# Patient Record
Sex: MALE | Race: WHITE | URBAN - NONMETROPOLITAN AREA
[De-identification: names, ages, dates, MRNs, and addresses within clinical notes are randomized per-mention and may not be internally consistent; named-entity substitution may affect disease eponyms.]

---

## 2018-12-03 ENCOUNTER — OFFICE VISIT (OUTPATIENT)
Dept: OTOLARYNGOLOGY | Facility: OTHER | Age: 23
End: 2018-12-03
Attending: OTOLARYNGOLOGY
Payer: COMMERCIAL

## 2018-12-03 VITALS
WEIGHT: 170 LBS | BODY MASS INDEX: 24.34 KG/M2 | HEART RATE: 73 BPM | TEMPERATURE: 97.6 F | HEIGHT: 70 IN | DIASTOLIC BLOOD PRESSURE: 60 MMHG | SYSTOLIC BLOOD PRESSURE: 120 MMHG

## 2018-12-03 DIAGNOSIS — J01.00 ACUTE MAXILLARY SINUSITIS, RECURRENCE NOT SPECIFIED: Primary | ICD-10-CM

## 2018-12-03 DIAGNOSIS — J35.1 TONSILLAR HYPERTROPHY: ICD-10-CM

## 2018-12-03 DIAGNOSIS — G44.219 EPISODIC TENSION-TYPE HEADACHE, NOT INTRACTABLE: ICD-10-CM

## 2018-12-03 DIAGNOSIS — J30.2 SEASONAL ALLERGIC RHINITIS, UNSPECIFIED TRIGGER: ICD-10-CM

## 2018-12-03 DIAGNOSIS — K14.1 GEOGRAPHIC TONGUE: ICD-10-CM

## 2018-12-03 DIAGNOSIS — R53.82 CHRONIC FATIGUE: ICD-10-CM

## 2018-12-03 PROCEDURE — 31575 DIAGNOSTIC LARYNGOSCOPY: CPT | Performed by: OTOLARYNGOLOGY

## 2018-12-03 PROCEDURE — 99204 OFFICE O/P NEW MOD 45 MIN: CPT | Mod: 25 | Performed by: OTOLARYNGOLOGY

## 2018-12-03 RX ORDER — BUDESONIDE 0.5 MG/2ML
INHALANT ORAL
Qty: 3 BOX | Refills: 11 | Status: SHIPPED | OUTPATIENT
Start: 2018-12-03

## 2018-12-03 RX ORDER — FLUTICASONE PROPIONATE 50 MCG
2 SPRAY, SUSPENSION (ML) NASAL DAILY
Qty: 16 G | Refills: 12 | Status: SHIPPED | OUTPATIENT
Start: 2018-12-03 | End: 2019-01-02

## 2018-12-03 RX ORDER — AZITHROMYCIN 250 MG/1
TABLET, FILM COATED ORAL
Qty: 6 TABLET | Refills: 0 | Status: SHIPPED | OUTPATIENT
Start: 2018-12-03

## 2018-12-03 RX ORDER — LORATADINE 10 MG/1
10 TABLET ORAL DAILY
Qty: 90 TABLET | Refills: 12 | Status: SHIPPED | OUTPATIENT
Start: 2018-12-03

## 2018-12-03 ASSESSMENT — PAIN SCALES - GENERAL: PAINLEVEL: NO PAIN (0)

## 2018-12-03 NOTE — NURSING NOTE
"Chief Complaint   Patient presents with     Airway Obstruction     Pt has been referred by Maria Madison for obstructed airway, reflux, and sleep apnea.       Initial /60 (BP Location: Right arm, Cuff Size: Adult Regular)  Pulse 73  Temp 97.6  F (36.4  C) (Tympanic)  Ht 1.778 m (5' 10\")  Wt 77.1 kg (170 lb)  BMI 24.39 kg/m2 Estimated body mass index is 24.39 kg/(m^2) as calculated from the following:    Height as of this encounter: 1.778 m (5' 10\").    Weight as of this encounter: 77.1 kg (170 lb).  Medication Reconciliation: complete    Lisa Rice LPN    "

## 2018-12-03 NOTE — PATIENT INSTRUCTIONS
Thank you for allowing Dr. Moser and our ENT team to participate in your care.  If your medications are too expensive, please give the nurse a call.  We can possibly change this medication.  If you have a scheduling or an appointment question please contact our Health Unit Coordinator at their direct line [phone].   ALL nursing questions or concerns can be directed to your ENT nurse at: [phone] - Julianne    Use Budesonide Rinses Twice Daily  Start Flonase as prescribed  Start Claritin  Complete Sleep Study  Complete Environmental Allergy blood draw   Follow up with Yasmin Albright NP in 1 month    Budesonide nasal saline irrigation per instructions:  -Obtain Ruslan Med Sinus rinse over the counter.    -Use warm distilled water and 2 packets of the salt solution that comes with the bottle, dissolve in bottle up to the 240 mL liberty.  -Add 1 vial of budesonide.  -Irrigate each side of your nose leaning over the sink, using 1/3 to 1/2 the volume of the bottle in each nostril every irrigation.  Irrigate 2 times daily.  -If additional rinses are needed/recommended, you may use the plan Ruslan Med Sinus irrigation without the use of added budesonide.

## 2018-12-03 NOTE — MR AVS SNAPSHOT
After Visit Summary   12/3/2018    Manuel Carter    MRN: [id number]           Patient Information     Date Of Birth          1995        Visit Information        Provider Department      12/3/2018 3:30 PM Dominique Moser MD [facility]        Care Instructions    Thank you for allowing Dr. Moser and our ENT team to participate in your care.  If your medications are too expensive, please give the nurse a call.  We can possibly change this medication.  If you have a scheduling or an appointment question please contact our Health Unit Coordinator at their direct line [phone].   ALL nursing questions or concerns can be directed to your ENT nurse at: [phone] - Julianne    Use Budesonide Rinses Twice Daily  Start Flonase as prescribed  Start Claritin  Complete Sleep Study  Complete Environmental Allergy blood draw   Follow up with Yasmin Albright NP in 1 month    Budesonide nasal saline irrigation per instructions:  -Obtain Ruslan Med Sinus rinse over the counter.    -Use warm distilled water and 2 packets of the salt solution that comes with the bottle, dissolve in bottle up to the 240 mL liberty.  -Add 1 vial of budesonide.  -Irrigate each side of your nose leaning over the sink, using 1/3 to 1/2 the volume of the bottle in each nostril every irrigation.  Irrigate 2 times daily.  -If additional rinses are needed/recommended, you may use the plan Ruslan Med Sinus irrigation without the use of added budesonide.             Follow-ups after your visit        Follow-up notes from your care team     Return in about 4 weeks (around 12/31/2018).      Who to contact     If you have questions or need follow up information about today's clinic visit or your schedule please contact [facility] directly at [phone].  Normal or non-critical lab and imaging results will be communicated to you by MyChart, letter or phone within 4 business days after  "the clinic has received the results. If you do not hear from us within 7 days, please contact the clinic through [employer] or phone. If you have a critical or abnormal lab result, we will notify you by phone as soon as possible.  Submit refill requests through [employer] or call your pharmacy and they will forward the refill request to us. Please allow 3 business days for your refill to be completed.          Additional Information About Your Visit        Care EveryWhere ID     This is your Care EveryWhere ID. This could be used by other organizations to access your [facility] medical records  [id number]        Your Vitals Were     Pulse Temperature Height BMI (Body Mass Index)          73 97.6  F (36.4  C) (Tympanic) 1.778 m (5' 10\") 24.39 kg/m2         Blood Pressure from Last 3 Encounters:   12/03/18 120/60    Weight from Last 3 Encounters:   12/03/18 77.1 kg (170 lb)              Today, you had the following     No orders found for display       Primary Care Provider Fax #    [facility] [phone]       [address] [place] [id number]        Equal Access to Services     [facility]: Hadii fitz ku hadasho Sobenitez, waaxda luqadaha, qaybta kaalmada adechang, jil huizar . So [facility] [phone].    ATENCIÓN: Si habla español, tiene a cao disposición servicios gratuitos de asistencia lingüística. Sunni al [phone].    We comply with applicable federal civil rights laws and [place] laws. We do not discriminate on the basis of race, color, national origin, age, disability, sex, sexual orientation, or gender identity.            Thank you!     Thank you for choosing [facility]  for your care. Our goal is always to provide you with excellent care. Hearing back from our patients is one way we can continue to improve our services. Please take a few minutes to complete the written survey that you may receive in the mail " after your visit with us. Thank you!             Your Updated Medication List - Protect others around you: Learn how to safely use, store and throw away your medicines at www.disposemymeds.org.      Notice  As of 12/3/2018  4:28 PM    You have not been prescribed any medications.

## 2018-12-03 NOTE — PROGRESS NOTES
"Otolaryngology Consultation    Patient: Manuel Carter  : 1995    Patient presents with:  Airway Obstruction: Pt has been referred by Maria Madison for obstructed airway, reflux, and sleep apnea.      HPI:  Manuel Carter is a 23 year old male seen today for concerns for a crowded oral airway and possible Laryngopharyngeal reflux .    Was referred by Dr. Gricelda DDS after complaints of the tension type device  headache across his temples, and evidence of bruxism.   he was fit with a dental guard but has not noticed any improvement over the past 2 weeks with use.  He denies change in vision or nausea with his headaches but he does have chronic fatigue despite 7-8 hours of sleep.  He is a  at the elementary school in [place].  He denies heroic snoring or josesito sleep apnea and is never been told that he snores loudly  He feels chronically run down  There is no weight loss fever chills or night sweats  He has not had any weight gain    He has frequent postnasal drainage but denies congestion or maxillary pressure.  No known history of inhalant allergies  There is a maternal history of migraines  He denies heartburn      No current outpatient prescriptions on file.       Allergies: Review of patient's allergies indicates no known allergies.     History reviewed. No pertinent past medical history.    Past Surgical History:   Procedure Laterality Date     KNEE SURGERY Left        ENT family history reviewed    Social History   Substance Use Topics     Smoking status: Former Smoker     Types: Dip, chew, snus or snuff     Quit date: 2015     Smokeless tobacco: Former User     Types: Chew     Alcohol use Yes       Review of Systems  ROS: 10 point ROS neg other than the symptoms noted above in the HPI     Physical Exam  /60 (BP Location: Right arm, Cuff Size: Adult Regular)  Pulse 73  Temp 97.6  F (36.4  C) (Tympanic)  Ht 1.778 m (5' 10\")  Wt 77.1 kg (170 lb)  BMI 24.39 " kg/m2  General - The patient is well nourished and well developed, and appears to have good nutritional status.  Alert and oriented to person and place, answers questions and cooperates with examination appropriately.   Head and Face - Normocephalic and atraumatic, with no gross asymmetry noted.  The facial nerve is intact, with strong symmetric movements.  Voice and Breathing - The patient was breathing comfortably without the use of accessory muscles. There was no wheezing, stridor, or stertor.  The patients voice was clear and strong, and had appropriate pitch and quality.  No josesito peripheral digital clubbing or cyanosis   Ears -The external auditory canals are patent, the tympanic membranes are intact without effusion, retraction or mass.  Bony landmarks are intact.  Eyes - Extraocular movements intact, and the pupils were reactive to light.  Sclera were not icteric or injected, conjunctiva were pink and moist.  Mouth - Examination of the oral cavity showed pink, healthy oral mucosa. No lesions or ulcerations noted.  The tongue was mobile and midline, and the dentition were in good condition.  Mild geographic tongue with mild erythema of central mucosa no ulceration  Throat - The walls of the oropharynx were smooth, pink, moist, symmetric, and had no lesions or ulcerations.  The tonsillar pillars and soft palate were symmetric.  The uvula was midline on elevation.   Hart Tongue Position IIa, grade 2 tonsils, thick elongated uvula.  Erythematous posterior oropharyngeal banding  Neck - No palpable enlarged fixed cervical lymph nodes.  No neck cysts or unusual tenderness to palpation.   No palpable fixed thyroid nodules or concerning goiter.  The trachea is grossly midline.   Nose - External contour is symmetric, no gross deflection or scars.  Nasal mucosa is pink and moist with no abnormal mucus.  The septum and turbinates were evaluated:  inferior turbinate hypertrophy.  No masses noted on  examination.    Attempts at mirror laryngoscopy were not possible due to gag reflex.  Therefore I proceeded with a fiberoptic examination after informed consent.  First I sprayed both sides of the nose with a mixture of lidocaine and neosynephrine.  I then passed the scope through the nasal cavity.  Scant purulent drainage from the middle meatus bilaterally.    The nasopharynx was mucosally covered and symmetric.  There is no adenoid hypertrophy or erythema.  The eustachian tube openings were unobstructed.  Going further down I had a clear view of the base of tongue which had normal appearing lingual tonsillar tissue.  The base of tongue was free of lesions, and the vallecula was open.  The epiglottis was smooth and mucosally covered.  The supraglottic larynx was then clearly visualized.  There are secretions draining from the nares to the supraglottis the vocal cords moved smoothly and symmetrically and were pearly white.  No supraglottic erythema or arytenoid edema.  The pyriform sinuses were open and without josesito mass or pooling of secretions upon valsalva, and the limited view of the postcricoid region did not show any lesions.  The patient tolerated the procedure well.      Impression and Plan- Manuel Carter is a 23 year old male with:    ICD-10-CM    1. Acute maxillary sinusitis, recurrence not specified J01.00 azithromycin (ZITHROMAX) 250 MG tablet     budesonide (PULMICORT) 0.5 MG/2ML neb solution   2. Tonsillar hypertrophy J35.1    3. 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 chronic rhinitis J30.2 Dr. Moser's Environmental Allergy Panel: Laboratory Miscellaneous Order     IgE     fluticasone (FLONASE) 50 MCG/ACT nasal spray     loratadine (CLARITIN) 10 MG tablet   5. Episodic tension-type headache, not intractable G44.219 SLEEP EVALUATION & MANAGEMENT REFERRAL - ADULT -[facility] [place] [facility] [phone] (Age 5 and up)   6. Chronic fatigue R53.82 SLEEP EVALUATION & MANAGEMENT REFERRAL - ADULT  -[facility] [phone] (Age 5 and up)       This appears more of an allergic problem with the appearance of his oropharynx and he does have an acute maxillary sinusitis which will be treated due to his symptoms of chronic fatigue.  If the sleep study is negative I may still have him see a sleep specialist to rule out narcolepsy although this is not obvious with his history.  At this point there is no indication for tonsillectomy and this was discussed with him I did encourage him to continue to wear his dental guard and if headaches do not improve he may need to see neurology talked about headache triggers he does not drink excess caffeine and does try to maintain good sleep hygiene     no evidence of LPR     Should have workup by his primary care if fatigue persists this was discussed with him    Use Budesonide Rinses Twice Daily  Start Flonase as prescribed  Start Claritin  Complete Sleep Study  Complete Environmental Allergy blood draw   Follow up with Yasmin Albright NP in 1 month   if symptoms exacerbate  I would recommend CAT scans of the sinuses    Budesonide nasal saline irrigation per instructions:  -Obtain Ruslan Med Sinus rinse over the counter.    -Use warm distilled water and 2 packets of the salt solution that comes with the bottle, dissolve in bottle up to the 240 mL liberty.  -Add 1 vial of budesonide.  -Irrigate each side of your nose leaning over the sink, using 1/3 to 1/2 the volume of the bottle in each nostril every irrigation.  Irrigate 2 times daily.  -If additional rinses are needed/recommended, you may use the plan Ruslan Med Sinus irrigation without the use of added budesonide.       Dominique Moser D.O.  Otolaryngology/Head and Neck Surgery  Allergy